# Patient Record
Sex: MALE | Race: BLACK OR AFRICAN AMERICAN | NOT HISPANIC OR LATINO | Employment: OTHER | URBAN - METROPOLITAN AREA
[De-identification: names, ages, dates, MRNs, and addresses within clinical notes are randomized per-mention and may not be internally consistent; named-entity substitution may affect disease eponyms.]

---

## 2018-02-12 ENCOUNTER — HOSPITAL ENCOUNTER (EMERGENCY)
Facility: OTHER | Age: 63
Discharge: HOME | End: 2018-02-12
Attending: EMERGENCY MEDICINE
Payer: MEDICARE

## 2018-02-12 VITALS
SYSTOLIC BLOOD PRESSURE: 148 MMHG | TEMPERATURE: 98 F | WEIGHT: 186 LBS | HEART RATE: 82 BPM | DIASTOLIC BLOOD PRESSURE: 68 MMHG | OXYGEN SATURATION: 98 % | BODY MASS INDEX: 28.19 KG/M2 | RESPIRATION RATE: 18 BRPM | HEIGHT: 68 IN

## 2018-02-12 DIAGNOSIS — M10.9 GOUT OF LEFT ANKLE, UNSPECIFIED CAUSE, UNSPECIFIED CHRONICITY: Primary | ICD-10-CM

## 2018-02-12 LAB — GLUCOSE SERPL-MCNC: 214 MG/DL (ref 70–110)

## 2018-02-12 PROCEDURE — 96372 THER/PROPH/DIAG INJ SC/IM: CPT

## 2018-02-12 PROCEDURE — 63600175 PHARM REV CODE 636 W HCPCS: Performed by: EMERGENCY MEDICINE

## 2018-02-12 PROCEDURE — 99283 EMERGENCY DEPT VISIT LOW MDM: CPT | Mod: 25

## 2018-02-12 RX ORDER — KETOROLAC TROMETHAMINE 30 MG/ML
60 INJECTION, SOLUTION INTRAMUSCULAR; INTRAVENOUS
Status: COMPLETED | OUTPATIENT
Start: 2018-02-12 | End: 2018-02-12

## 2018-02-12 RX ORDER — HYDROCODONE BITARTRATE AND ACETAMINOPHEN 5; 325 MG/1; MG/1
1 TABLET ORAL EVERY 4 HOURS PRN
Qty: 10 TABLET | Refills: 0 | Status: SHIPPED | OUTPATIENT
Start: 2018-02-12 | End: 2018-02-22

## 2018-02-12 RX ORDER — INDOMETHACIN 25 MG/1
50 CAPSULE ORAL
Qty: 30 CAPSULE | Refills: 0 | Status: SHIPPED | OUTPATIENT
Start: 2018-02-12 | End: 2018-06-12

## 2018-02-12 RX ADMIN — KETOROLAC TROMETHAMINE 60 MG: 30 INJECTION, SOLUTION INTRAMUSCULAR at 10:02

## 2018-02-12 NOTE — ED NOTES
Pt reports to ED with complaints of left foot pain and swelling since last week. Pt reports history of gout but does not know the name of medication. Pt reports being out of gout medicine.

## 2018-02-12 NOTE — ED PROVIDER NOTES
"Encounter Date: 2/12/2018       History     Chief Complaint   Patient presents with    Foot Pain     reports left foot pain 2/2 gout x2 weeks     Chief complaint: Gout  62-year-old complains of "gout" to his left ankle.  Patient has had swelling and pain to his ankle for the last 2 weeks.  Patient has been out of his unknown gout medicine since last week.  No trauma.  No fever.  He has not taken anything for the pain.  Pain worsens when he walks.  Patient says that he is sure it is gout because she's had multiple times.  He admits to dietary indiscretions he rates his pain as 9 out of 10      The history is provided by the patient.     Review of patient's allergies indicates:  No Known Allergies  Past Medical History:   Diagnosis Date    Diabetes mellitus     Gout     Hypertension      History reviewed. No pertinent surgical history.  History reviewed. No pertinent family history.  Social History   Substance Use Topics    Smoking status: Current Every Day Smoker     Types: Cigarettes    Smokeless tobacco: Never Used    Alcohol use No      Comment: quit drinking 5 months ago     Review of Systems   Constitutional: Negative for fever.   Musculoskeletal: Positive for gait problem and joint swelling.   Skin: Negative for color change.   Neurological: Negative for weakness and numbness.       Physical Exam     Initial Vitals [02/12/18 0902]   BP Pulse Resp Temp SpO2   (!) 152/75 69 16 97.9 °F (36.6 °C) 100 %      MAP       100.67         Physical Exam    Nursing note and vitals reviewed.  Constitutional: No distress.   HENT:   Head: Normocephalic.   Pulmonary/Chest: No respiratory distress.   Musculoskeletal:        Feet:    Neurological: He is alert and oriented to person, place, and time. He has normal strength.   Skin: No erythema.   Psychiatric: He has a normal mood and affect.         ED Course   Procedures  Labs Reviewed   POCT GLUCOSE - Abnormal; Notable for the following:        Result Value    POC Glucose " "214 (*)     All other components within normal limits             Medical Decision Making:   Initial Assessment:   62-year-old who complains of "gout" to his left ankle.  On exam patient does have tenderness and edema bilaterally but no warmth or erythema  ED Management:  Patient was given Toradol IM.  He'll be discharged on indomethacin and 10 hydrocodone.  He says he has an appointment with his doctor next week                   ED Course      Clinical Impression:   The encounter diagnosis was Gout of left ankle, unspecified cause, unspecified chronicity.                           Cony Merino MD  02/12/18 1146    "

## 2018-02-13 ENCOUNTER — HOSPITAL ENCOUNTER (EMERGENCY)
Facility: OTHER | Age: 63
Discharge: HOME | End: 2018-02-13
Attending: EMERGENCY MEDICINE
Payer: MEDICARE

## 2018-02-13 VITALS
SYSTOLIC BLOOD PRESSURE: 124 MMHG | DIASTOLIC BLOOD PRESSURE: 69 MMHG | TEMPERATURE: 97 F | OXYGEN SATURATION: 100 % | HEART RATE: 62 BPM | WEIGHT: 186 LBS | BODY MASS INDEX: 28.19 KG/M2 | HEIGHT: 68 IN | RESPIRATION RATE: 20 BRPM

## 2018-02-13 DIAGNOSIS — T78.3XXA ANGIOEDEMA OF LIPS, INITIAL ENCOUNTER: Primary | ICD-10-CM

## 2018-02-13 PROCEDURE — 63600175 PHARM REV CODE 636 W HCPCS: Performed by: EMERGENCY MEDICINE

## 2018-02-13 PROCEDURE — 96372 THER/PROPH/DIAG INJ SC/IM: CPT

## 2018-02-13 PROCEDURE — 99283 EMERGENCY DEPT VISIT LOW MDM: CPT | Mod: 25

## 2018-02-13 RX ORDER — DEXAMETHASONE SODIUM PHOSPHATE 4 MG/ML
4 INJECTION, SOLUTION INTRA-ARTICULAR; INTRALESIONAL; INTRAMUSCULAR; INTRAVENOUS; SOFT TISSUE
Status: COMPLETED | OUTPATIENT
Start: 2018-02-13 | End: 2018-02-13

## 2018-02-13 RX ADMIN — DEXAMETHASONE SODIUM PHOSPHATE 4 MG: 4 INJECTION, SOLUTION INTRAMUSCULAR; INTRAVENOUS at 10:02

## 2018-02-13 NOTE — ED NOTES
Patient has verified the spelling of their name and  on armband    LOC: The patient is awake, alert, and aware of environment with an appropriate affect, the patient is oriented x 4 and speaking appropriately.     APPEARANCE: Patient resting comfortably and in no acute distress, patient is clean and well groomed, patient's clothing is properly fastened.     RESPIRATORY: Airway is open and patent, respirations are spontaneous, patient has a normal effort and rate, no accessory muscle use noted, bilateral breath sounds clear, denies SOB     CARDIAC:  No chest pain.     HEENT: +Lower lip swelling. No tongue swelling noted. Denies shortness of breath.     COMPLAINT: Patient presented to the ED for lower lip swelling x 1 day. Patient denies any dysphagia. Patient BP medication noted to be Losartan.

## 2018-02-13 NOTE — ED PROVIDER NOTES
Encounter Date: 2/13/2018       History     Chief Complaint   Patient presents with    Oral Swelling     PT REPORTS GIVEN MEDICATION YESTERDAY FOR GOUT AND NOW HIS LIPS ARE SWELLING     Chief complaint: Swelling to lower lip  62-year-old noted swelling to his lower lip last night.  This began after he took indomethacin.  He denies shortness of breath or difficulty swallowing.  Patient was prescribed this for gout to his left ankle yesterday.  He said that his ankle only has minimal swelling now when he is able to walk on it.  Patient also takes losartan.          Review of patient's allergies indicates:  No Known Allergies  Past Medical History:   Diagnosis Date    Diabetes mellitus     Gout     Hypertension      History reviewed. No pertinent surgical history.  History reviewed. No pertinent family history.  Social History   Substance Use Topics    Smoking status: Current Every Day Smoker     Types: Cigarettes    Smokeless tobacco: Never Used    Alcohol use No      Comment: quit drinking 5 months ago     Review of Systems   Constitutional: Negative for fever.   HENT: Positive for facial swelling. Negative for trouble swallowing and voice change.    Respiratory: Negative for shortness of breath.    Musculoskeletal: Positive for joint swelling (mild left ankle).       Physical Exam     Initial Vitals [02/13/18 1005]   BP Pulse Resp Temp SpO2   125/74 69 19 98.2 °F (36.8 °C) 98 %      MAP       91         Physical Exam    Nursing note reviewed.  Constitutional: No distress.   HENT:   Mouth/Throat: Oropharynx is clear and moist. No posterior oropharyngeal edema.       Pulmonary/Chest: Breath sounds normal. No stridor. No respiratory distress. He exhibits no tenderness.   Musculoskeletal: Normal range of motion.   Left ankle with minimal edema to the right medial malleolus   Skin: No erythema.   Psychiatric: He has a normal mood and affect.         ED Course   Procedures  Labs Reviewed   POCT GLUCOSE              Medical Decision Making:   Initial Assessment:   62-year-old complains of swelling to his lower lip.  On exam patient has minimal swelling.  No tongue or oral swelling.  Lungs are clear.  ED Management:  Patient did not want to get Benadryl since he was driving.  He was given an injection of Decadron.  He understands this will increase his blood sugar.  He was advised to stop taken indomethacin as well as the losartan since it is unclear which caused the swelling.  He was advised to continue taking the Benadryl and to call his doctor tomorrow.                   ED Course      Clinical Impression:   The encounter diagnosis was Angioedema of lips, initial encounter.                           Cony Merino MD  02/13/18 0079

## 2018-02-13 NOTE — DISCHARGE INSTRUCTIONS
STOP LOSARTAN AND INDOCIN, CALL YOUR DOCTOR TOMORROW. TAKE BENADRYL 1-2 EVERY 6 HOURS FOR 2 DAYS. RETURN HERE FOR ANY FURTHER PROBLEMS OR CONCERNS

## 2018-06-12 ENCOUNTER — HOSPITAL ENCOUNTER (EMERGENCY)
Facility: HOSPITAL | Age: 63
Discharge: HOME | End: 2018-06-12
Attending: EMERGENCY MEDICINE
Payer: MEDICARE

## 2018-06-12 VITALS
BODY MASS INDEX: 26.67 KG/M2 | HEART RATE: 67 BPM | OXYGEN SATURATION: 100 % | SYSTOLIC BLOOD PRESSURE: 141 MMHG | TEMPERATURE: 98 F | RESPIRATION RATE: 18 BRPM | DIASTOLIC BLOOD PRESSURE: 76 MMHG | HEIGHT: 68 IN | WEIGHT: 176 LBS

## 2018-06-12 DIAGNOSIS — M1A.0720 CHRONIC GOUT OF LEFT ANKLE, UNSPECIFIED CAUSE: Primary | ICD-10-CM

## 2018-06-12 LAB — POCT GLUCOSE: 179 MG/DL (ref 70–110)

## 2018-06-12 PROCEDURE — 63600175 PHARM REV CODE 636 W HCPCS: Performed by: EMERGENCY MEDICINE

## 2018-06-12 PROCEDURE — 96372 THER/PROPH/DIAG INJ SC/IM: CPT

## 2018-06-12 PROCEDURE — 99283 EMERGENCY DEPT VISIT LOW MDM: CPT | Mod: 25

## 2018-06-12 RX ORDER — FEBUXOSTAT 80 MG/1
80 TABLET, FILM COATED ORAL DAILY
COMMUNITY

## 2018-06-12 RX ORDER — TRAMADOL HYDROCHLORIDE 50 MG/1
50 TABLET ORAL EVERY 6 HOURS PRN
Qty: 12 TABLET | Refills: 0 | Status: SHIPPED | OUTPATIENT
Start: 2018-06-12 | End: 2018-06-22

## 2018-06-12 RX ORDER — DEXAMETHASONE SODIUM PHOSPHATE 4 MG/ML
4 INJECTION, SOLUTION INTRA-ARTICULAR; INTRALESIONAL; INTRAMUSCULAR; INTRAVENOUS; SOFT TISSUE
Status: COMPLETED | OUTPATIENT
Start: 2018-06-12 | End: 2018-06-12

## 2018-06-12 RX ADMIN — DEXAMETHASONE SODIUM PHOSPHATE 4 MG: 4 INJECTION, SOLUTION INTRAMUSCULAR; INTRAVENOUS at 08:06

## 2018-06-12 NOTE — ED NOTES
Neuro: AAOx3  Resp: Airway patent, respirations even/unlabored  Cardiac: Skin pink/warm/dry, pulses intact  Abdomen: Soft, non-tender to palpation, denies N/V/D  Musculoskeletal: Moves all extremities equally, ROM intact  Left ankle pain

## 2018-06-12 NOTE — ED PROVIDER NOTES
Encounter Date: 6/12/2018       History     Chief Complaint   Patient presents with    Ankle Pain     pt presents with c/o pain and swelling to L, ankle x4 days; Hx of gout; pt reports Rx meds not helping to control symptoms     Chief complaint:  Gout   62-year-old who complains of gout to his left ankle.  This has been ongoing for 3 days.  Patient was placed on Uloric I his doctor.  He said that this is not helping.  Patient rates his pain as 8/10 when he walks.  No fever.  He has noticed redness to the site.      The history is provided by the patient and medical records.     Review of patient's allergies indicates:   Allergen Reactions    Indocin [indomethacin] Swelling     Lip Swelling     Past Medical History:   Diagnosis Date    Diabetes mellitus     Gout     Hypertension      History reviewed. No pertinent surgical history.  History reviewed. No pertinent family history.  Social History   Substance Use Topics    Smoking status: Current Every Day Smoker     Types: Cigarettes    Smokeless tobacco: Never Used    Alcohol use No      Comment: quit drinking 5 months ago     Review of Systems   Constitutional: Negative for fever.   Musculoskeletal: Positive for gait problem and joint swelling.   Skin: Positive for color change and rash.   Neurological: Negative for weakness and numbness.       Physical Exam     Initial Vitals   BP Pulse Resp Temp SpO2   06/12/18 0651 06/12/18 0651 06/12/18 0651 06/12/18 0755 06/12/18 0651   (!) 141/76 67 18 97.9 °F (36.6 °C) 100 %      MAP       --                Physical Exam    Nursing note and vitals reviewed.  Constitutional: No distress.   Eyes: Conjunctivae are normal.   Cardiovascular: Intact distal pulses.   Musculoskeletal: Normal range of motion. He exhibits edema and tenderness.        Feet:    Neurological: He is alert and oriented to person, place, and time. He has normal strength.   Psychiatric: He has a normal mood and affect.         ED Course    Procedures  Labs Reviewed   POCT GLUCOSE - Abnormal; Notable for the following:        Result Value    POCT Glucose 179 (*)     All other components within normal limits   POCT GLUCOSE          No orders to display        Medical Decision Making:   Initial Assessment:   62-year-old with a history of gout complains of swelling to his ankle.  Patient does have warmth and tenderness to the site.  He is neurologically intact  ED Management:  Patient was given Decadron IM.  He will be discharged on tramadol and advised to call his primary care physician for close follow-up                      Clinical Impression:   The encounter diagnosis was Chronic gout of left ankle, unspecified cause.                             Cony Merino MD  06/12/18 0841

## 2018-06-13 ENCOUNTER — PES CALL (OUTPATIENT)
Dept: ADMINISTRATIVE | Facility: CLINIC | Age: 63
End: 2018-06-13

## 2018-06-25 ENCOUNTER — HOSPITAL ENCOUNTER (EMERGENCY)
Facility: HOSPITAL | Age: 63
Discharge: HOME | End: 2018-06-25
Attending: INTERNAL MEDICINE
Payer: MEDICARE

## 2018-06-25 VITALS
HEIGHT: 68 IN | HEART RATE: 69 BPM | BODY MASS INDEX: 28.04 KG/M2 | RESPIRATION RATE: 18 BRPM | WEIGHT: 185 LBS | OXYGEN SATURATION: 100 % | SYSTOLIC BLOOD PRESSURE: 143 MMHG | DIASTOLIC BLOOD PRESSURE: 74 MMHG

## 2018-06-25 DIAGNOSIS — M17.12 ARTHRITIS OF LEFT KNEE: Primary | ICD-10-CM

## 2018-06-25 DIAGNOSIS — M19.072 ARTHRITIS OF LEFT ANKLE: ICD-10-CM

## 2018-06-25 PROCEDURE — 99283 EMERGENCY DEPT VISIT LOW MDM: CPT

## 2018-06-25 PROCEDURE — 25000003 PHARM REV CODE 250: Performed by: INTERNAL MEDICINE

## 2018-06-25 RX ORDER — IBUPROFEN 600 MG/1
600 TABLET ORAL 3 TIMES DAILY
Qty: 30 TABLET | Refills: 0 | Status: SHIPPED | OUTPATIENT
Start: 2018-06-25

## 2018-06-25 RX ORDER — IBUPROFEN 400 MG/1
800 TABLET ORAL
Status: COMPLETED | OUTPATIENT
Start: 2018-06-25 | End: 2018-06-25

## 2018-06-25 RX ADMIN — IBUPROFEN 800 MG: 400 TABLET ORAL at 03:06

## 2018-06-25 NOTE — ED NOTES
"Pt aaox3, rr even unlabored skin wdp cap refill <2 sec. Pt c/o left ankle pain starting a "few days" ago with trauma or injury. No obvious deform noted, + swelling, ambulates with steady gait. Pulse present, nad    "

## 2018-07-10 NOTE — ED PROVIDER NOTES
"Encounter Date: 6/25/2018       History     Chief Complaint   Patient presents with    Ankle Pain     pt c/o L ankle pain and L knee pain x " a few days"     The history is provided by the patient. No  was used.   Leg Pain    There was no injury mechanism. The incident occurred several days ago. The pain is present in the left knee and left ankle. The quality of the pain is described as aching. The pain is at a severity of 4/10. Associated symptoms include loss of motion. Pertinent negatives include no numbness, no inability to bear weight and no loss of sensation. He reports no foreign bodies present. The symptoms are aggravated by bearing weight.     Review of patient's allergies indicates:   Allergen Reactions    Indocin [indomethacin] Swelling     Lip Swelling     Past Medical History:   Diagnosis Date    Diabetes mellitus     Gout     Hypertension      No past surgical history on file.  No family history on file.  Social History   Substance Use Topics    Smoking status: Current Every Day Smoker     Types: Cigarettes    Smokeless tobacco: Never Used    Alcohol use No      Comment: quit drinking 5 months ago     Review of Systems   Musculoskeletal: Positive for arthralgias.   Neurological: Negative for numbness.   All other systems reviewed and are negative.      Physical Exam     Initial Vitals [06/25/18 0230]   BP Pulse Resp Temp SpO2   (!) 143/74 69 18 -- 100 %      MAP       --         Physical Exam    Nursing note and vitals reviewed.  Constitutional: He appears well-developed and well-nourished.   HENT:   Head: Normocephalic and atraumatic.   Eyes: Conjunctivae and EOM are normal.   Neck: Normal range of motion.   Cardiovascular: Normal rate, regular rhythm and normal heart sounds.   Pulmonary/Chest: Breath sounds normal. No respiratory distress. He has no wheezes.   Abdominal: Soft. Bowel sounds are normal. He exhibits no distension.   Musculoskeletal: Normal range of motion. "   Left ankle and knee pain upon movement, slight increased warmth, no TTP, no gross deformity   Neurological: He is alert.   Skin: Skin is warm and dry.   Psychiatric: He has a normal mood and affect.         ED Course   Procedures  Labs Reviewed - No data to display       Imaging Results    None                               Clinical Impression:   The primary encounter diagnosis was Arthritis of left knee. A diagnosis of Arthritis of left ankle was also pertinent to this visit.      Disposition:   Disposition: Discharged  Condition: Stable                        Hansel Villarreal MD  07/10/18 0217